# Patient Record
Sex: FEMALE | Race: WHITE | NOT HISPANIC OR LATINO | Employment: FULL TIME | URBAN - METROPOLITAN AREA
[De-identification: names, ages, dates, MRNs, and addresses within clinical notes are randomized per-mention and may not be internally consistent; named-entity substitution may affect disease eponyms.]

---

## 2020-02-19 ENCOUNTER — APPOINTMENT (EMERGENCY)
Dept: ULTRASOUND IMAGING | Facility: HOSPITAL | Age: 35
End: 2020-02-19
Payer: COMMERCIAL

## 2020-02-19 ENCOUNTER — ANESTHESIA EVENT (OUTPATIENT)
Dept: ANESTHESIOLOGY | Facility: HOSPITAL | Age: 35
End: 2020-02-19

## 2020-02-19 ENCOUNTER — ANESTHESIA (OUTPATIENT)
Dept: ANESTHESIOLOGY | Facility: HOSPITAL | Age: 35
End: 2020-02-19

## 2020-02-19 ENCOUNTER — HOSPITAL ENCOUNTER (EMERGENCY)
Facility: HOSPITAL | Age: 35
End: 2020-02-19
Attending: EMERGENCY MEDICINE | Admitting: EMERGENCY MEDICINE
Payer: COMMERCIAL

## 2020-02-19 VITALS
OXYGEN SATURATION: 96 % | RESPIRATION RATE: 18 BRPM | BODY MASS INDEX: 26.68 KG/M2 | HEIGHT: 67 IN | HEART RATE: 80 BPM | DIASTOLIC BLOOD PRESSURE: 51 MMHG | SYSTOLIC BLOOD PRESSURE: 105 MMHG | WEIGHT: 170 LBS | TEMPERATURE: 97.9 F

## 2020-02-19 DIAGNOSIS — O00.90 RUPTURED ECTOPIC PREGNANCY: Primary | ICD-10-CM

## 2020-02-19 LAB
ANION GAP SERPL CALCULATED.3IONS-SCNC: 9 MMOL/L (ref 4–13)
B-HCG SERPL-ACNC: 27 MIU/ML
BASOPHILS # BLD AUTO: 0.05 THOUSANDS/ΜL (ref 0–0.1)
BASOPHILS NFR BLD AUTO: 1 % (ref 0–1)
BUN SERPL-MCNC: 9 MG/DL (ref 5–25)
CALCIUM SERPL-MCNC: 8.4 MG/DL (ref 8.3–10.1)
CHLORIDE SERPL-SCNC: 103 MMOL/L (ref 100–108)
CO2 SERPL-SCNC: 30 MMOL/L (ref 21–32)
CREAT SERPL-MCNC: 0.74 MG/DL (ref 0.6–1.3)
EOSINOPHIL # BLD AUTO: 0.18 THOUSAND/ΜL (ref 0–0.61)
EOSINOPHIL NFR BLD AUTO: 2 % (ref 0–6)
ERYTHROCYTE [DISTWIDTH] IN BLOOD BY AUTOMATED COUNT: 12.4 % (ref 11.6–15.1)
EXT PREG TEST URINE: POSITIVE
EXT. CONTROL ED NAV: NORMAL
GFR SERPL CREATININE-BSD FRML MDRD: 105 ML/MIN/1.73SQ M
GLUCOSE SERPL-MCNC: 93 MG/DL (ref 65–140)
HCT VFR BLD AUTO: 40.1 % (ref 34.8–46.1)
HGB BLD-MCNC: 13.1 G/DL (ref 11.5–15.4)
IMM GRANULOCYTES # BLD AUTO: 0.02 THOUSAND/UL (ref 0–0.2)
IMM GRANULOCYTES NFR BLD AUTO: 0 % (ref 0–2)
LYMPHOCYTES # BLD AUTO: 1.89 THOUSANDS/ΜL (ref 0.6–4.47)
LYMPHOCYTES NFR BLD AUTO: 23 % (ref 14–44)
MCH RBC QN AUTO: 29.8 PG (ref 26.8–34.3)
MCHC RBC AUTO-ENTMCNC: 32.7 G/DL (ref 31.4–37.4)
MCV RBC AUTO: 91 FL (ref 82–98)
MONOCYTES # BLD AUTO: 0.64 THOUSAND/ΜL (ref 0.17–1.22)
MONOCYTES NFR BLD AUTO: 8 % (ref 4–12)
NEUTROPHILS # BLD AUTO: 5.42 THOUSANDS/ΜL (ref 1.85–7.62)
NEUTS SEG NFR BLD AUTO: 66 % (ref 43–75)
NRBC BLD AUTO-RTO: 0 /100 WBCS
PLATELET # BLD AUTO: 269 THOUSANDS/UL (ref 149–390)
PMV BLD AUTO: 10.1 FL (ref 8.9–12.7)
POTASSIUM SERPL-SCNC: 3.5 MMOL/L (ref 3.5–5.3)
RBC # BLD AUTO: 4.4 MILLION/UL (ref 3.81–5.12)
SODIUM SERPL-SCNC: 142 MMOL/L (ref 136–145)
WBC # BLD AUTO: 8.2 THOUSAND/UL (ref 4.31–10.16)

## 2020-02-19 PROCEDURE — 99285 EMERGENCY DEPT VISIT HI MDM: CPT | Performed by: EMERGENCY MEDICINE

## 2020-02-19 PROCEDURE — 36415 COLL VENOUS BLD VENIPUNCTURE: CPT | Performed by: EMERGENCY MEDICINE

## 2020-02-19 PROCEDURE — 96360 HYDRATION IV INFUSION INIT: CPT

## 2020-02-19 PROCEDURE — 85025 COMPLETE CBC W/AUTO DIFF WBC: CPT | Performed by: EMERGENCY MEDICINE

## 2020-02-19 PROCEDURE — 99285 EMERGENCY DEPT VISIT HI MDM: CPT

## 2020-02-19 PROCEDURE — 80048 BASIC METABOLIC PNL TOTAL CA: CPT | Performed by: EMERGENCY MEDICINE

## 2020-02-19 PROCEDURE — 81025 URINE PREGNANCY TEST: CPT | Performed by: EMERGENCY MEDICINE

## 2020-02-19 PROCEDURE — 76815 OB US LIMITED FETUS(S): CPT

## 2020-02-19 PROCEDURE — 84702 CHORIONIC GONADOTROPIN TEST: CPT | Performed by: EMERGENCY MEDICINE

## 2020-02-19 RX ADMIN — SODIUM CHLORIDE 1000 ML: 0.9 INJECTION, SOLUTION INTRAVENOUS at 18:39

## 2020-02-19 NOTE — ED PROVIDER NOTES
History  Chief Complaint   Patient presents with    Threatened Miscarriage     Pt states she is 5-6 weeks pregnant, bleeding started 2020  P1, DLMP 1/10/2020, 5wk 5 days by dates w/ bleeding and spotting since 2/10  Has been following w/ gyn in [facility] for serial quants - last done last week - still awaiting results  Started w/ left sided sharp/stabbing pain, intermittent, wax/wane in intensity  Hx of right sided ectopic pregnancy 5y ago that required surgery and this feels the same  Hadn't had bleeding for the last 5 days and started again w/ spotting tonight  Ate lunch around  today and last had water around 1730  History provided by:  Patient   used: No    Vaginal Bleeding   Quality:  Dark red  Severity:  Mild  Onset quality:  Gradual  Timing:  Intermittent  Progression:  Waxing and waning  Chronicity:  New  Possible pregnancy: yes    Context: spontaneously    Relieved by:  None tried  Worsened by:  Nothing  Ineffective treatments:  None tried  Associated symptoms: abdominal pain    Associated symptoms: no back pain, no dizziness, no dysuria, no fatigue and no nausea    Risk factors: hx of ectopic pregnancy and prior miscarriage        Prior to Admission Medications   Prescriptions Last Dose Informant Patient Reported? Taking?   hydrocortisone-acetic acid (VOSOL-HC) otic solution   No No   Sig: Administer 5 drops to the right ear 3 (three) times a day for 7 days      Facility-Administered Medications: None       History reviewed  No pertinent past medical history  Past Surgical History:   Procedure Laterality Date     SECTION      WISDOM TOOTH EXTRACTION         History reviewed  No pertinent family history  I have reviewed and agree with the history as documented      Social History     Tobacco Use    Smoking status: Never Smoker    Smokeless tobacco: Never Used   Substance Use Topics    Alcohol use: No    Drug use: No       Review of Systems   Constitutional: Negative for fatigue  Gastrointestinal: Positive for abdominal pain  Negative for nausea  Genitourinary: Positive for vaginal bleeding  Negative for dysuria  Musculoskeletal: Negative for back pain  Neurological: Negative for dizziness  All other systems reviewed and are negative  Physical Exam  Physical Exam   Constitutional: She is oriented to person, place, and time  She appears well-developed and well-nourished  HENT:   Nose: Nose normal    Mouth/Throat: Oropharynx is clear and moist    Eyes: Conjunctivae are normal    Neck: Neck supple  Cardiovascular: Normal rate and regular rhythm  Pulmonary/Chest: Effort normal and breath sounds normal    Abdominal: Soft  Normal appearance  There is tenderness in the left lower quadrant  There is no rigidity, no rebound and no guarding  Musculoskeletal: She exhibits no deformity  Neurological: She is alert and oriented to person, place, and time  Skin: Skin is warm  Psychiatric: She has a normal mood and affect  Nursing note and vitals reviewed        Vital Signs  ED Triage Vitals [02/19/20 1804]   Temperature Pulse Respirations Blood Pressure SpO2   97 9 °F (36 6 °C) 72 20 129/58 99 %      Temp Source Heart Rate Source Patient Position - Orthostatic VS BP Location FiO2 (%)   Temporal Monitor Lying Right arm --      Pain Score       7           Vitals:    02/19/20 2230 02/19/20 2245 02/19/20 2300 02/19/20 2315   BP:    105/51   Pulse: 84 86 86 80   Patient Position - Orthostatic VS:    Lying         Visual Acuity  Visual Acuity      Most Recent Value   L Pupil Size (mm)  3   R Pupil Size (mm)  3          ED Medications  Medications   sodium chloride 0 9 % bolus 1,000 mL (0 mL Intravenous Stopped 2/19/20 1939)       Diagnostic Studies  Results Reviewed     Procedure Component Value Units Date/Time    Quantitative hCG [[id number]]  (Abnormal) Collected:  02/19/20 1837    Lab Status:  Final result Specimen:  Blood from Arm, Right Updated:  02/19/20 2256     HCG, Quant 27 mIU/mL     Narrative:        Expected Ranges:     Approximate               Approximate HCG  Gestation age          Concentration ( mIU/mL)  _____________          ______________________   [place]                      HCG values  0 2-1                       5-50  1-2                           2-3                         100-5000  3-4                         500-49101  4-5                         1000-13250  5-6                         [id number]  6-8                         [id number]  8-12                        [id number]      Basic metabolic panel [[id number]] Collected:  02/19/20 1836    Lab Status:  Final result Specimen:  Blood from Arm, Right Updated:  02/19/20 1936     Sodium 142 mmol/L      Potassium 3 5 mmol/L      Chloride 103 mmol/L      CO2 30 mmol/L      ANION GAP 9 mmol/L      BUN 9 mg/dL      Creatinine 0 74 mg/dL      Glucose 93 mg/dL      Calcium 8 4 mg/dL      eGFR 105 ml/min/1 73sq m     Narrative:       [facility] guidelines for Chronic Kidney Disease (CKD):     Stage 1 with normal or high GFR (GFR > 90 mL/min/1 73 square meters)    Stage 2 Mild CKD (GFR = 60-89 mL/min/1 73 square meters)    Stage 3A Moderate CKD (GFR = 45-59 mL/min/1 73 square meters)    Stage 3B Moderate CKD (GFR = 30-44 mL/min/1 73 square meters)    Stage 4 Severe CKD (GFR = 15-29 mL/min/1 73 square meters)    Stage 5 End Stage CKD (GFR <15 mL/min/1 73 square meters)  Note: GFR calculation is accurate only with a steady state creatinine    CBC and differential [[id number]] Collected:  02/19/20 1837    Lab Status:  Final result Specimen:  Blood from Arm, Right Updated:  02/19/20 1847     WBC 8 20 Thousand/uL      RBC 4 40 Million/uL      Hemoglobin 13 1 g/dL      Hematocrit 40 1 %      MCV 91 fL      MCH 29 8 pg      MCHC 32 7 g/dL      RDW 12 4 %      MPV 10 1 fL      Platelets 267 Thousands/uL      nRBC 0 /100 WBCs      Neutrophils Relative 66 %      Immat GRANS % 0 %      Lymphocytes Relative 23 %      Monocytes Relative 8 %      Eosinophils Relative 2 %      Basophils Relative 1 %      Neutrophils Absolute 5 42 Thousands/µL      Immature Grans Absolute 0 02 Thousand/uL      Lymphocytes Absolute 1 89 Thousands/µL      Monocytes Absolute 0 64 Thousand/µL      Eosinophils Absolute 0 18 Thousand/µL      Basophils Absolute 0 05 Thousands/µL     POCT pregnancy, urine [[id number]]  (Normal) Resulted:  02/19/20 1819    Lab Status:  Final result Updated:  02/19/20 1819     EXT PREG TEST UR (Ref: Negative) Positive     Control Valid                 US OB pregnancy limited with transvaginal   Final Result by Interface, Radiology Results In (02/19 2244)         Vascular 2 3 cm left adnexal mass suspicious for ectopic pregnancy  Debris-containing fluid within the pelvis could relate to ruptured ectopic  OB/surgical consult recommended  I personally discussed this study with Dr Jaime Zuluaga on 2/19/2020 at 8:41 PM                       Workstation performed: [id number]                    Procedures  Procedures         ED Course  ED Course as of Feb 21 1653 Wed Feb 19, 2020   1858 Care transferred to Dr Jaime Zuluaga                                  MDM      Disposition  Final diagnoses:   Ruptured ectopic pregnancy     Time reflects when diagnosis was documented in both MDM as applicable and the Disposition within this note     Time User Action Codes Description Comment    2/19/2020  8:49 PM Fisher Silence J Add [O00 90] Ruptured ectopic pregnancy       ED Disposition     ED Disposition Condition Date/Time Comment    Transfer to Another Facility-In Network  Wed Feb 19, 2020  9:45 PM Dennise Lyman should be transferred out to [facility] MD Documentation      Most Recent Value   Patient Condition  The patient has been stabilized such that within reasonable medical probability, no material deterioration of the patient condition or the condition of the unborn child(jorge) is likely to result from the transfer   Benefits of Transfer  Specialized equipment and/or services available at the receiving facility (Include comment)________________________ [OB/GYN]   Risks of Transfer  Potential for delay in receiving treatment, Potential deterioration of medical condition, Loss of IV, Possible worsening of condition or death during transfer, Increased discomfort during transfer   Accepting Physician  Dr Eduardo Begrer Name, Taniya Brown   Sending MD Dr Jaime Zuluaga   Provider Certification  General risk, such as traffic hazards, adverse weather conditions, rough terrain or turbulence, possible failure of equipment (including vehicle or aircraft), or consequences of actions of persons outside the control of the transport personnel, Unanticipated needs of medical equipment and personnel during transport, Risk of worsening condition, The possibility of a transport vehicle being unavailable      RN Documentation      Most [facility] Name, Taniya Brown      Follow-up Information    None         Discharge Medication List as of 2/19/2020 11:45 PM      CONTINUE these medications which have NOT CHANGED    Details   hydrocortisone-acetic acid (VOSOL-HC) otic solution Administer 5 drops to the right ear 3 (three) times a day for 7 days, Starting 11/27/2016, Until Sun 12/4/16, Print           No discharge procedures on file      PDMP Review     None          ED Provider  Electronically Signed by           Donnie Colindres DO  02/21/20 5542

## 2020-02-20 ENCOUNTER — HOSPITAL ENCOUNTER (EMERGENCY)
Facility: HOSPITAL | Age: 35
Discharge: HOME | End: 2020-02-20
Admitting: OBSTETRICS & GYNECOLOGY
Payer: COMMERCIAL

## 2020-02-20 VITALS
DIASTOLIC BLOOD PRESSURE: 55 MMHG | RESPIRATION RATE: 16 BRPM | HEART RATE: 76 BPM | OXYGEN SATURATION: 97 % | WEIGHT: 173.28 LBS | SYSTOLIC BLOOD PRESSURE: 96 MMHG | BODY MASS INDEX: 27.14 KG/M2 | TEMPERATURE: 99 F

## 2020-02-20 DIAGNOSIS — O36.80X0 PREGNANCY OF UNKNOWN ANATOMIC LOCATION: Primary | ICD-10-CM

## 2020-02-20 LAB
ABO GROUP BLD: NORMAL
ABO GROUP BLD: NORMAL
B-HCG SERPL-ACNC: 28.9 MIU/ML
BASOPHILS # BLD AUTO: 0.06 THOUSANDS/ΜL (ref 0–0.1)
BASOPHILS NFR BLD AUTO: 1 % (ref 0–1)
BLD GP AB SCN SERPL QL: NEGATIVE
EOSINOPHIL # BLD AUTO: 0.09 THOUSAND/ΜL (ref 0–0.61)
EOSINOPHIL NFR BLD AUTO: 1 % (ref 0–6)
ERYTHROCYTE [DISTWIDTH] IN BLOOD BY AUTOMATED COUNT: 12.6 % (ref 11.6–15.1)
HCT VFR BLD AUTO: 40.4 % (ref 34.8–46.1)
HGB BLD-MCNC: 12.9 G/DL (ref 11.5–15.4)
IMM GRANULOCYTES # BLD AUTO: 0.05 THOUSAND/UL (ref 0–0.2)
IMM GRANULOCYTES NFR BLD AUTO: 1 % (ref 0–2)
LYMPHOCYTES # BLD AUTO: 2.01 THOUSANDS/ΜL (ref 0.6–4.47)
LYMPHOCYTES NFR BLD AUTO: 20 % (ref 14–44)
MCH RBC QN AUTO: 30 PG (ref 26.8–34.3)
MCHC RBC AUTO-ENTMCNC: 31.9 G/DL (ref 31.4–37.4)
MCV RBC AUTO: 94 FL (ref 82–98)
MONOCYTES # BLD AUTO: 0.6 THOUSAND/ΜL (ref 0.17–1.22)
MONOCYTES NFR BLD AUTO: 6 % (ref 4–12)
NEUTROPHILS # BLD AUTO: 7.23 THOUSANDS/ΜL (ref 1.85–7.62)
NEUTS SEG NFR BLD AUTO: 71 % (ref 43–75)
NRBC BLD AUTO-RTO: 0 /100 WBCS
PLATELET # BLD AUTO: 254 THOUSANDS/UL (ref 149–390)
PMV BLD AUTO: 10.1 FL (ref 8.9–12.7)
RBC # BLD AUTO: 4.3 MILLION/UL (ref 3.81–5.12)
RH BLD: POSITIVE
RH BLD: POSITIVE
SPECIMEN EXPIRATION DATE: NORMAL
WBC # BLD AUTO: 10.04 THOUSAND/UL (ref 4.31–10.16)

## 2020-02-20 PROCEDURE — 99284 EMERGENCY DEPT VISIT MOD MDM: CPT

## 2020-02-20 PROCEDURE — 36415 COLL VENOUS BLD VENIPUNCTURE: CPT | Performed by: OBSTETRICS & GYNECOLOGY

## 2020-02-20 PROCEDURE — 86900 BLOOD TYPING SEROLOGIC ABO: CPT | Performed by: OBSTETRICS & GYNECOLOGY

## 2020-02-20 PROCEDURE — 84702 CHORIONIC GONADOTROPIN TEST: CPT | Performed by: OBSTETRICS & GYNECOLOGY

## 2020-02-20 PROCEDURE — 99283 EMERGENCY DEPT VISIT LOW MDM: CPT | Performed by: OBSTETRICS & GYNECOLOGY

## 2020-02-20 PROCEDURE — 85025 COMPLETE CBC W/AUTO DIFF WBC: CPT | Performed by: OBSTETRICS & GYNECOLOGY

## 2020-02-20 PROCEDURE — 86850 RBC ANTIBODY SCREEN: CPT | Performed by: OBSTETRICS & GYNECOLOGY

## 2020-02-20 PROCEDURE — 86901 BLOOD TYPING SEROLOGIC RH(D): CPT | Performed by: OBSTETRICS & GYNECOLOGY

## 2020-02-20 NOTE — DISCHARGE INSTRUCTIONS
Miscarriage   WHAT YOU NEED TO KNOW:   A miscarriage is the loss of a fetus within the first 20 weeks of pregnancy  A miscarriage may also be called a spontaneous  or an early pregnancy loss  DISCHARGE INSTRUCTIONS:   Seek care immediately if:   · You have foul-smelling drainage or pus coming from your vagina  · You have heavy vaginal bleeding and soak 1 pad or more in an hour  · You have severe abdominal pain  · You feel like your heart is beating faster than normal      · You feel extremely weak or dizzy  Contact your healthcare provider if:   · You have a fever greater than 100 4°F or chills  · You have extreme sadness, grief, or feel unable to cope with what has happened  · You have questions or concerns about your condition or care  Self-care:   · Do not put anything in your vagina for 2 weeks or as directed  Do not use tampons, douche, or have sex  These actions can cause infection and pain  · Use sanitary pads as needed  You may have light bleeding or spotting for 2 weeks  · Do not take a bath or go swimming for 2 weeks or as directed  These actions may increase your risk for an infection  Take showers only  · Rest as needed  Slowly start to do more each day  Return to your daily activities as directed  · Talk to your healthcare provider about birth control  If you would like to prevent another pregnancy, ask your healthcare provider which type of birth control is best for you  · Join a support group or therapy to help you cope  A miscarriage may be very difficult for you, your partner, and other members of your family  There is no right way to feel after a miscarriage  You may feel overwhelming grief or other emotions  It may be helpful to talk to a friend, family member, or counselor about your feelings  You may worry that you could have another miscarriage  Talk to your healthcare provider about your concerns   He may be able to help you reduce the risk for another miscarriage  He may also help you find ways to cope with grief  For more information:   · [employer]  [address] [place] [address]  Phone: 2- [phone] - [phone]  Phone: 2- [phone]  Web Address: http://[employer]/  org  · [facility]  [address] [facility] , [address] [place] [address]  Web Address: [employer]  Follow up with your healthcare provider as directed: You may need to see your healthcare provider for blood tests or an ultrasound  Write down your questions so you remember to ask them during your visits  © 2017 [address] [employer]  Information is for End User's use only and may not be sold, redistributed or otherwise used for commercial purposes  All illustrations and images included in CareNotes® are the copyrighted property of A D A M , Inc  or Natan Kessler  The above information is an  only  It is not intended as medical advice for individual conditions or treatments  Talk to your doctor, nurse or pharmacist before following any medical regimen to see if it is safe and effective for you  Ruptured Ovarian Cyst   WHAT YOU NEED TO KNOW:   A ruptured ovarian cyst is a cyst that breaks open  A cyst is a sac that grows on an ovary  This sac usually contains fluid, but may sometimes have blood or tissue in it  A large cyst that ruptures may lead to problems that need immediate care  It is important to follow up with your healthcare provider to make sure your cyst went away completely with treatment  DISCHARGE INSTRUCTIONS:   Call 911 for any of the following:   · You are too weak or dizzy to stand up  Seek care immediately if:   · You have severe pain in your pelvis or in your abdomen  · You have pain along with a fever, nausea, or vomiting       · You have signs of shock from blood loss, such as dizziness, cold or clammy skin, or fast breathing  Contact your healthcare provider if:   · You notice changes in your monthly periods, or you begin to have nausea or vomiting with your periods  · You have new or worsening symptoms  · Your pain does not get better with pain medicine  · You have pain during sex  · You have bleeding from your vagina that is not your period  · Your abdomen is swollen, or you have a full or heavy feeling in your lower abdomen  · You have trouble urinating  · You have questions or concerns about your condition or care  Medicines: You may need any of the following:  · NSAIDs , such as ibuprofen, help decrease swelling, pain, and fever  This medicine is available with or without a doctor's order  NSAIDs can cause stomach bleeding or kidney problems in certain people  If you take blood thinner medicine, always ask if NSAIDs are safe for you  Always read the medicine label and follow directions  Do not give these medicines to children under 10months of age without direction from your child's healthcare provider  · Antibiotics  may be given to prevent or fight an infection caused by bacteria  · Take your medicine as directed  Contact your healthcare provider if you think your medicine is not helping or if you have side effects  Tell him or her if you are allergic to any medicine  Keep a list of the medicines, vitamins, and herbs you take  Include the amounts, and when and why you take them  Bring the list or the pill bottles to follow-up visits  Carry your medicine list with you in case of an emergency  Manage or prevent a ruptured ovarian cyst:   · Apply heat where you have pain, as directed  Heat can help relieve mild pain  Use a heating pad (set on low) or hot water bottle  Wrap the pad or bottle in a towel before you apply it to your skin  Apply heat for 20 minutes every hour, or as directed  A warm bath may also help relieve the pain  · Ask when to come in for a follow-up examination    You may need another ultrasound 6 weeks after your cyst was treated  This will help make sure the cyst is no longer growing or causing health problems  You may also need ultrasound tests for 2 or 3 monthly periods to see how hormones affect your ovaries  · Ask about birth control pills  These may help reduce your risk for cysts  Ask your healthcare provider if birth control pills are right for you  The risk for a blood clot is higher if you take birth control pills, especially if you are older than 35 or smoke  · Have a pelvic exam every year  This may also be called a well woman visit  The exam will include a Pap smear to check for certain cancers  Your healthcare provider will also press on your abdomen to check for lumps or other problems  A pelvic exam can help find problems early  This makes treatment easier and more effective  Tell your healthcare provider if you notice any changes in your monthly periods  Examples include periods that start on a different day than usual, or are lighter or heavier than usual  Tell your provider if you have worse pain than usual, or if the pain is different than you had before  Follow up with your healthcare provider as directed:  Write down your questions so you remember to ask them during your visits  © 2017 [facility] Information is for End User's use only and may not be sold, redistributed or otherwise used for commercial purposes  All illustrations and images included in CareNotes® are the copyrighted property of [employer] , [employer]  or Natan Kessler  The above information is an  only  It is not intended as medical advice for individual conditions or treatments  Talk to your doctor, nurse or pharmacist before following any medical regimen to see if it is safe and effective for you

## 2020-02-20 NOTE — ED NOTES
Per Dr Dipika Avilez from [facility] patient to be discharged home at this time       Sterling Amaral RN  02/20/20 1505

## 2020-02-20 NOTE — ED CARE HANDOFF
Emergency Department Sign Out Note        Sign out and transfer of care from Dr Jacky Scott  See Separate Emergency Department note  The patient, Dolores Nichole, was evaluated by the previous provider for abdominal pain with vaginal bleeding/spoting in first trimester pregnancy  Workup Completed:  LLQ pain  History of prior ectopic  Urine preg positive  Beta hCG pending  US pending  ED Course / Workup Pending (followup):  Ruptured ectopic left adnexa on ultrasound  Patient hemodynamically stable at this time  Patient to be transferred to [facility] for further management by OB/GYN  ED Course as of Feb 19 2256 Wed Feb 19, 2020 2047 Informed of ruptured ectopic by radiology  PACS consult placed for OB/GYN       2139 Spoke with PACS again  Unable to get a hold of on call OB/GYN at this time  Requested to reach out to OB/GYN on call at [facility] or [facility]  2144 Spoke with Dr Claude Hoffmann  Patient to be transferred to [facility]  Procedures  MDM    Disposition  Final diagnoses:   Ruptured ectopic pregnancy     Time reflects when diagnosis was documented in both MDM as applicable and the Disposition within this note     Time User Action Codes Description Comment    2/19/2020  8:49 PM Edilberto Dawson Add [O00 90] Ruptured ectopic pregnancy       ED Disposition     ED Disposition Condition Date/Time Comment    Transfer to Another Facility-In Network  Wed Feb 19, 2020  9:45 PM Dolores Nichole should be transferred out to [facility] MD Documentation      Most Recent Value   Patient Condition  The patient has been stabilized such that within reasonable medical probability, no material deterioration of the patient condition or the condition of the unborn child(jorge) is likely to result from the transfer   Benefits of Transfer  Specialized equipment and/or services available at the receiving facility (Include comment)________________________ [OB/GYN]   Risks of Transfer  Potential for delay in receiving treatment, Potential deterioration of medical condition, Loss of IV, Possible worsening of condition or death during transfer, Increased discomfort during transfer   Accepting Physician  Dr Eduardo Berger Name, Taniya Zuluaga   Provider Certification  General risk, such as traffic hazards, adverse weather conditions, rough terrain or turbulence, possible failure of equipment (including vehicle or aircraft), or consequences of actions of persons outside the control of the transport personnel, Unanticipated needs of medical equipment and personnel during transport, Risk of worsening condition, The possibility of a transport vehicle being unavailable      RN Documentation      Most [facility] Name, Taniya Brown      Follow-up Information    None       Patient's Medications   Discharge Prescriptions    No medications on file     No discharge procedures on file         ED Provider  Electronically Signed by     Crystal Kunz MD  02/19/20 6146

## 2020-02-20 NOTE — H&P
Consult - OB/GYN   Faye Lombard 27 y o  female MRN: [id number]  Unit/Bed#: ED 23 Encounter: [id number]    27 y o  [id number] sexually active reproductive aged female     She is a patient of [facility] by default    Chief complaint:  Abdominal pain    HPI:  Faye Lombard 65-year-old [id number] who presented to [facility] secondary to abdominal pain  LMP was 01/10/2020 and patient was being followed by her OBGYN with serial HCG levels  She reports that the levels were 60 at its peak approximately 1 week ago  No records are available of these HCG levels  Since then, patient has had vaginal bleeding  This afternoon, patient had 9/10 left lower abdominal pain  The pain was stabbing in nature and wrapped toward her back  She notes that her vaginal bleeding is minimal   She reports no nausea, vomiting, fevers, chills, diarrhea, constipation  She denies shoulder pain  Of note patient has a history of right ectopic pregnancy approximately 5 years ago that was treated with right salpingectomy  Pelvic ultrasound at [facility] reports "No evidence of intrauterine pregnancy  Vascular 2 3 cm left adnexal mass suspicious for ectopic pregnancy  Debris-containing fluid within the pelvis could relate to ruptured ectopic"  Hemoglobin was 13 1  Patient was deemed to be stable for transfer  Given patient's history and pelvic ultrasound findings, the decision was made to transfer her to [facility] for evaluation by OBGYN  Prior to transfer, hCG level was not available  On my evaluation, patient is very comfortable  She reports that her pain has completely resolved at this time without the use of pain medications  She states that her bleeding is minimal at this time  Active Problems:  Patient Active Problem List   Diagnosis    Pregnancy of unknown anatomic location     PMH:  History reviewed  No pertinent past medical history      PSH:  Past Surgical History:   Procedure Laterality Date     SECTION      WISDOM TOOTH EXTRACTION       Meds:  Current Facility-Administered Medications on File Prior to Encounter   Medication Dose Route Frequency Provider Last Rate Last Dose    [COMPLETED] sodium chloride 0 9 % bolus 1,000 mL  1,000 mL Intravenous Once Taylor NIRALI Mo DO   Stopped at 02/19/20 1939     Current Outpatient Medications on File Prior to Encounter   Medication Sig Dispense Refill    hydrocortisone-acetic acid (VOSOL-HC) otic solution Administer 5 drops to the right ear 3 (three) times a day for 7 days 10 mL 0       Allergies: Allergies   Allergen Reactions    Amoxicillin Hives    Azithromycin Hives       Physical Exam:  BP 96/55 (BP Location: Right arm)   Pulse 76   Temp 99 °F (37 2 °C) (Oral)   Resp 16   Wt 78 6 kg (173 lb 4 5 oz)   LMP 01/10/2020   SpO2 97%   BMI 27 14 kg/m²     Physical Exam   Constitutional: She is oriented to person, place, and time  She appears well-nourished  No distress  HENT:   Head: Normocephalic and atraumatic  Cardiovascular: Normal rate, regular rhythm and normal heart sounds  Pulmonary/Chest: Effort normal and breath sounds normal  No respiratory distress  She has no wheezes  Abdominal: Soft  Bowel sounds are normal  She exhibits no distension and no mass  There is no tenderness  There is no rebound and no guarding  No abdominal pain at this time  No rebound tenderness, guarding, peritoneal signs  Genitourinary:   Genitourinary Comments: Mild discomfort with bimanual exam   No adnexal masses palpated  No uterine tenderness  Bimanual exam deferred at this time   Neurological: She is alert and oriented to person, place, and time  Skin: Skin is warm and dry  She is not diaphoretic  Psychiatric: She has a normal mood and affect   Her behavior is normal        Recent Results (from the past 12 hour(s))   POCT pregnancy, urine    Collection Time: 02/19/20  6:19 PM   Result Value Ref Range    EXT PREG TEST UR (Ref: Negative) Positive Control Valid    Basic metabolic panel    Collection Time: 02/19/20  6:36 PM   Result Value Ref Range    Sodium 142 136 - 145 mmol/L    Potassium 3 5 3 5 - 5 3 mmol/L    Chloride 103 100 - 108 mmol/L    CO2 30 21 - 32 mmol/L    ANION GAP 9 4 - 13 mmol/L    BUN 9 5 - 25 mg/dL    Creatinine 0 74 0 60 - 1 30 mg/dL    Glucose 93 65 - 140 mg/dL    Calcium 8 4 8 3 - 10 1 mg/dL    eGFR 105 ml/min/1 73sq m   CBC and differential    Collection Time: 02/19/20  6:37 PM   Result Value Ref Range    WBC 8 20 4  31 - 10 16 Thousand/uL    RBC 4 40 3 81 - 5 12 Million/uL    Hemoglobin 13 1 11 5 - 15 4 g/dL    Hematocrit 40 1 34 8 - 46 1 %    MCV 91 82 - 98 fL    MCH 29 8 26 8 - 34 3 pg    MCHC 32 7 31 4 - 37 4 g/dL    RDW 12 4 11 6 - 15 1 %    MPV 10 1 8 9 - 12 7 fL    Platelets 347 332 - 214 Thousands/uL    nRBC 0 /100 WBCs    Neutrophils Relative 66 43 - 75 %    Immat GRANS % 0 0 - 2 %    Lymphocytes Relative 23 14 - 44 %    Monocytes Relative 8 4 - 12 %    Eosinophils Relative 2 0 - 6 %    Basophils Relative 1 0 - 1 %    Neutrophils Absolute 5 42 1 85 - 7 62 Thousands/µL    Immature Grans Absolute 0 02 0 00 - 0 20 Thousand/uL    Lymphocytes Absolute 1 89 0 60 - 4 47 Thousands/µL    Monocytes Absolute 0 64 0 17 - 1 22 Thousand/µL    Eosinophils Absolute 0 18 0 00 - 0 61 Thousand/µL    Basophils Absolute 0 05 0 00 - 0 10 Thousands/µL   Quantitative hCG    Collection Time: 02/19/20  6:37 PM   Result Value Ref Range    HCG, Quant 27 (H) <=6 mIU/mL   CBC and differential    Collection Time: 02/20/20 12:50 AM   Result Value Ref Range    WBC 10 04 4 31 - 10 16 Thousand/uL    RBC 4 30 3 81 - 5 12 Million/uL    Hemoglobin 12 9 11 5 - 15 4 g/dL    Hematocrit 40 4 34 8 - 46 1 %    MCV 94 82 - 98 fL    MCH 30 0 26 8 - 34 3 pg    MCHC 31 9 31 4 - 37 4 g/dL    RDW 12 6 11 6 - 15 1 %    MPV 10 1 8 9 - 12 7 fL    Platelets 738 140 - 909 Thousands/uL    nRBC 0 /100 WBCs    Neutrophils Relative 71 43 - 75 %    Immat GRANS % 1 0 - 2 %    Lymphocytes Relative 20 14 - 44 %    Monocytes Relative 6 4 - 12 %    Eosinophils Relative 1 0 - 6 %    Basophils Relative 1 0 - 1 %    Neutrophils Absolute 7 23 1 85 - 7 62 Thousands/µL    Immature Grans Absolute 0 05 0 00 - 0 20 Thousand/uL    Lymphocytes Absolute 2 01 0 60 - 4 47 Thousands/µL    Monocytes Absolute 0 60 0 17 - 1 22 Thousand/µL    Eosinophils Absolute 0 09 0 00 - 0 61 Thousand/µL    Basophils Absolute 0 06 0 00 - 0 10 Thousands/µL   hCG, quantitative    Collection Time: 02/20/20 12:50 AM   Result Value Ref Range    HCG, Quant 28 9 (H) <=6 mIU/mL   ABORh Recheck - Contact Blood Bank Prior to Collection    Collection Time: 02/20/20 12:50 AM   Result Value Ref Range    ABO Grouping AB     Rh Factor Positive    Type and screen    Collection Time: 02/20/20  1:22 AM   Result Value Ref Range    ABO Grouping AB     Rh Factor Positive     Antibody Screen Negative     Specimen Expiration Date [id number]          Assessment:   27 y o  [id number]  sexually active reproductive aged female pregnancy of unknown location  Given that Hcg level is 27 and completely benign abdominal exam, ruptured ectopic pregnancy is unlikely at this time  Given acute nature of abdominal pain that was self-limiting and did not require pain medication, it is possible that patient had a ruptured hemorrhagic cyst   Given decline in HCG level and vaginal bleeding, it is possible that patient had a concurrent early IUP and subsequent SAB  Differential diagnosis still includes ectopic pregnancy  Discussed these findings with patient  Discussed that at this time surgery is not indicated  Patient was given the option of overnight admission for observation of vitals and repeat labs versus discharge home and follow up with her own OB/GYN  Patient appears to be very reliable and with good follow-up  Patient reporting that she is feeling much better, and that she would prefer to follow up with her own OB/GYN  Plan:   1   Script given for repeat CBC in a m   2  Patient is to follow up with OBGYN later this week with repeat HCG level  3  Patient to return to ED for evaluation if significant abdominal pain returns, heavy vaginal bleeding occurs, or any other significant/concerning symptoms develop  4   Tylenol and Motrin as needed for pain    This patient was seen and examined with Dr Nithya Clarke MD, PGY-2  2/20/2020  5:06 AM

## 2020-02-21 LAB
BASOPHILS # BLD AUTO: 0 X10E3/UL (ref 0–0.2)
BASOPHILS NFR BLD AUTO: 0 %
EOSINOPHIL # BLD AUTO: 0.1 X10E3/UL (ref 0–0.4)
EOSINOPHIL NFR BLD AUTO: 2 %
ERYTHROCYTE [DISTWIDTH] IN BLOOD BY AUTOMATED COUNT: 13.5 % (ref 11.7–15.4)
HCT VFR BLD AUTO: 37.5 % (ref 34–46.6)
HGB BLD-MCNC: 12.5 G/DL (ref 11.1–15.9)
IMM GRANULOCYTES # BLD: 0 X10E3/UL (ref 0–0.1)
IMM GRANULOCYTES NFR BLD: 0 %
LYMPHOCYTES # BLD AUTO: 1.8 X10E3/UL (ref 0.7–3.1)
LYMPHOCYTES NFR BLD AUTO: 27 %
MCH RBC QN AUTO: 30.2 PG (ref 26.6–33)
MCHC RBC AUTO-ENTMCNC: 33.3 G/DL (ref 31.5–35.7)
MCV RBC AUTO: 91 FL (ref 79–97)
MONOCYTES # BLD AUTO: 0.6 X10E3/UL (ref 0.1–0.9)
MONOCYTES NFR BLD AUTO: 9 %
NEUTROPHILS # BLD AUTO: 4 X10E3/UL (ref 1.4–7)
NEUTROPHILS NFR BLD AUTO: 62 %
PLATELET # BLD AUTO: 264 X10E3/UL (ref 150–450)
RBC # BLD AUTO: 4.14 X10E6/UL (ref 3.77–5.28)
WBC # BLD AUTO: 6.5 X10E3/UL (ref 3.4–10.8)